# Patient Record
Sex: FEMALE | Race: AMERICAN INDIAN OR ALASKA NATIVE
[De-identification: names, ages, dates, MRNs, and addresses within clinical notes are randomized per-mention and may not be internally consistent; named-entity substitution may affect disease eponyms.]

---

## 2018-04-16 ENCOUNTER — HOSPITAL ENCOUNTER (EMERGENCY)
Dept: HOSPITAL 12 - ER | Age: 38
Discharge: HOME | End: 2018-04-16
Payer: COMMERCIAL

## 2018-04-16 VITALS — BODY MASS INDEX: 24.16 KG/M2 | WEIGHT: 145 LBS | HEIGHT: 65 IN
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
## 2018-04-16 DIAGNOSIS — S81.012A: Primary | ICD-10-CM

## 2018-04-16 PROCEDURE — A4217 STERILE WATER/SALINE, 500 ML: HCPCS

## 2018-04-16 PROCEDURE — A4663 DIALYSIS BLOOD PRESSURE CUFF: HCPCS

## 2018-04-16 PROCEDURE — 90471 IMMUNIZATION ADMIN: CPT

## 2018-04-16 PROCEDURE — 12001 RPR S/N/AX/GEN/TRNK 2.5CM/<: CPT

## 2018-04-16 PROCEDURE — 99283 EMERGENCY DEPT VISIT LOW MDM: CPT

## 2018-04-16 PROCEDURE — 90715 TDAP VACCINE 7 YRS/> IM: CPT

## 2018-04-16 NOTE — NUR
Patient discharged to home in stable conditon & brisk steady gait.  Written and 
verbal after care instructions given to patient. Patient verbalizes 
understanding of instructions.